# Patient Record
Sex: FEMALE | Race: WHITE | NOT HISPANIC OR LATINO | URBAN - METROPOLITAN AREA
[De-identification: names, ages, dates, MRNs, and addresses within clinical notes are randomized per-mention and may not be internally consistent; named-entity substitution may affect disease eponyms.]

---

## 2017-01-14 ENCOUNTER — DOCUMENTATION ONLY (OUTPATIENT)
Dept: NEUROSURGERY | Facility: CLINIC | Age: 32
End: 2017-01-14

## 2017-01-14 NOTE — PROGRESS NOTES
Patient called in to say she was doing something yesterday and felt a pop in the back of her head. 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 I have recommended to sit with her head up as much as possible and we will see her in clinic soon. I do not think she needs to be advised to be admitted or seen in the ER unless she has other new neurological symptoms or the pain is uncontrollable. She is agreeable with plan and will call Monday am to get scheduled.

## 2017-01-17 ENCOUNTER — APPOINTMENT (OUTPATIENT)
Dept: MRI IMAGING | Facility: CLINIC | Age: 32
End: 2017-01-17
Attending: EMERGENCY MEDICINE
Payer: MEDICARE

## 2017-01-17 ENCOUNTER — HOSPITAL ENCOUNTER (EMERGENCY)
Facility: CLINIC | Age: 32
Discharge: HOME | End: 2017-01-17
Attending: EMERGENCY MEDICINE | Admitting: EMERGENCY MEDICINE
Payer: MEDICARE

## 2017-01-17 ENCOUNTER — TELEPHONE (OUTPATIENT)
Dept: NEUROSURGERY | Facility: CLINIC | Age: 32
End: 2017-01-17

## 2017-01-17 ENCOUNTER — APPOINTMENT (OUTPATIENT)
Dept: CT IMAGING | Facility: CLINIC | Age: 32
End: 2017-01-17
Attending: EMERGENCY MEDICINE
Payer: MEDICARE

## 2017-01-17 VITALS
WEIGHT: 250 LBS | SYSTOLIC BLOOD PRESSURE: 123 MMHG | BODY MASS INDEX: 41.65 KG/M2 | TEMPERATURE: 100.5 F | RESPIRATION RATE: 18 BRPM | OXYGEN SATURATION: 98 % | HEIGHT: 65 IN | HEART RATE: 116 BPM | DIASTOLIC BLOOD PRESSURE: 90 MMHG

## 2017-01-17 DIAGNOSIS — G95.89 ACQUIRED TETHERED SPINAL CORD (H): ICD-10-CM

## 2017-01-17 DIAGNOSIS — R51.9 HEADACHE: ICD-10-CM

## 2017-01-17 DIAGNOSIS — G93.5 CHIARI MALFORMATION TYPE I (H): ICD-10-CM

## 2017-01-17 LAB
ALBUMIN SERPL-MCNC: 3.4 G/DL (ref 3.4–5)
ALP SERPL-CCNC: 100 U/L (ref 40–150)
ALT SERPL W P-5'-P-CCNC: 13 U/L (ref 0–50)
ANION GAP SERPL CALCULATED.3IONS-SCNC: 9 MMOL/L (ref 3–14)
AST SERPL W P-5'-P-CCNC: 17 U/L (ref 0–45)
BASOPHILS # BLD AUTO: 0 10E9/L (ref 0–0.2)
BASOPHILS NFR BLD AUTO: 0.2 %
BILIRUB SERPL-MCNC: 0.4 MG/DL (ref 0.2–1.3)
BUN SERPL-MCNC: 12 MG/DL (ref 7–30)
CALCIUM SERPL-MCNC: 8.8 MG/DL (ref 8.5–10.1)
CHLORIDE SERPL-SCNC: 101 MMOL/L (ref 94–109)
CO2 SERPL-SCNC: 26 MMOL/L (ref 20–32)
CREAT SERPL-MCNC: 0.84 MG/DL (ref 0.52–1.04)
CRP SERPL-MCNC: 3.3 MG/L (ref 0–8)
DIFFERENTIAL METHOD BLD: ABNORMAL
EOSINOPHIL # BLD AUTO: 0.1 10E9/L (ref 0–0.7)
EOSINOPHIL NFR BLD AUTO: 1.1 %
ERYTHROCYTE [DISTWIDTH] IN BLOOD BY AUTOMATED COUNT: 13.1 % (ref 10–15)
ERYTHROCYTE [SEDIMENTATION RATE] IN BLOOD BY WESTERGREN METHOD: 13 MM/H (ref 0–20)
GFR SERPL CREATININE-BSD FRML MDRD: 78 ML/MIN/1.7M2
GLUCOSE SERPL-MCNC: 102 MG/DL (ref 70–99)
HCT VFR BLD AUTO: 44.6 % (ref 35–47)
HGB BLD-MCNC: 14.2 G/DL (ref 11.7–15.7)
IMM GRANULOCYTES # BLD: 0 10E9/L (ref 0–0.4)
IMM GRANULOCYTES NFR BLD: 0.2 %
LYMPHOCYTES # BLD AUTO: 2.1 10E9/L (ref 0.8–5.3)
LYMPHOCYTES NFR BLD AUTO: 17.4 %
MCH RBC QN AUTO: 29.2 PG (ref 26.5–33)
MCHC RBC AUTO-ENTMCNC: 31.8 G/DL (ref 31.5–36.5)
MCV RBC AUTO: 92 FL (ref 78–100)
MONOCYTES # BLD AUTO: 0.6 10E9/L (ref 0–1.3)
MONOCYTES NFR BLD AUTO: 4.6 %
NEUTROPHILS # BLD AUTO: 9.3 10E9/L (ref 1.6–8.3)
NEUTROPHILS NFR BLD AUTO: 76.5 %
NRBC # BLD AUTO: 0 10*3/UL
NRBC BLD AUTO-RTO: 0 /100
PLATELET # BLD AUTO: 276 10E9/L (ref 150–450)
POTASSIUM SERPL-SCNC: 3.9 MMOL/L (ref 3.4–5.3)
PROCALCITONIN SERPL-MCNC: NORMAL NG/ML
PROT SERPL-MCNC: 7.5 G/DL (ref 6.8–8.8)
RBC # BLD AUTO: 4.86 10E12/L (ref 3.8–5.2)
SODIUM SERPL-SCNC: 136 MMOL/L (ref 133–144)
WBC # BLD AUTO: 12.2 10E9/L (ref 4–11)

## 2017-01-17 PROCEDURE — 96376 TX/PRO/DX INJ SAME DRUG ADON: CPT | Performed by: EMERGENCY MEDICINE

## 2017-01-17 PROCEDURE — 99285 EMERGENCY DEPT VISIT HI MDM: CPT | Mod: Z6 | Performed by: EMERGENCY MEDICINE

## 2017-01-17 PROCEDURE — 25000132 ZZH RX MED GY IP 250 OP 250 PS 637: Mod: GY | Performed by: EMERGENCY MEDICINE

## 2017-01-17 PROCEDURE — 85652 RBC SED RATE AUTOMATED: CPT | Performed by: EMERGENCY MEDICINE

## 2017-01-17 PROCEDURE — 25500045 ZZH RX 255: Performed by: EMERGENCY MEDICINE

## 2017-01-17 PROCEDURE — 86140 C-REACTIVE PROTEIN: CPT | Performed by: EMERGENCY MEDICINE

## 2017-01-17 PROCEDURE — A9270 NON-COVERED ITEM OR SERVICE: HCPCS | Mod: GY | Performed by: EMERGENCY MEDICINE

## 2017-01-17 PROCEDURE — 70450 CT HEAD/BRAIN W/O DYE: CPT

## 2017-01-17 PROCEDURE — 70553 MRI BRAIN STEM W/O & W/DYE: CPT

## 2017-01-17 PROCEDURE — 25000128 H RX IP 250 OP 636: Performed by: EMERGENCY MEDICINE

## 2017-01-17 PROCEDURE — A9585 GADOBUTROL INJECTION: HCPCS | Performed by: EMERGENCY MEDICINE

## 2017-01-17 PROCEDURE — 25000128 H RX IP 250 OP 636

## 2017-01-17 PROCEDURE — 25000125 ZZHC RX 250: Performed by: EMERGENCY MEDICINE

## 2017-01-17 PROCEDURE — 84145 PROCALCITONIN (PCT): CPT | Performed by: EMERGENCY MEDICINE

## 2017-01-17 PROCEDURE — 96375 TX/PRO/DX INJ NEW DRUG ADDON: CPT | Performed by: EMERGENCY MEDICINE

## 2017-01-17 PROCEDURE — 80053 COMPREHEN METABOLIC PANEL: CPT | Performed by: EMERGENCY MEDICINE

## 2017-01-17 PROCEDURE — 96361 HYDRATE IV INFUSION ADD-ON: CPT | Performed by: EMERGENCY MEDICINE

## 2017-01-17 PROCEDURE — 96374 THER/PROPH/DIAG INJ IV PUSH: CPT | Performed by: EMERGENCY MEDICINE

## 2017-01-17 PROCEDURE — 99285 EMERGENCY DEPT VISIT HI MDM: CPT | Mod: 25 | Performed by: EMERGENCY MEDICINE

## 2017-01-17 PROCEDURE — 72156 MRI NECK SPINE W/O & W/DYE: CPT

## 2017-01-17 PROCEDURE — 85025 COMPLETE CBC W/AUTO DIFF WBC: CPT | Performed by: EMERGENCY MEDICINE

## 2017-01-17 RX ORDER — METHOCARBAMOL 750 MG/1
750 TABLET, FILM COATED ORAL 3 TIMES DAILY PRN
Qty: 60 TABLET | Refills: 0 | Status: SHIPPED | OUTPATIENT
Start: 2017-01-17

## 2017-01-17 RX ORDER — OLANZAPINE 5 MG/1
5 TABLET, ORALLY DISINTEGRATING ORAL ONCE
Status: COMPLETED | OUTPATIENT
Start: 2017-01-17 | End: 2017-01-17

## 2017-01-17 RX ORDER — GADOBUTROL 604.72 MG/ML
10 INJECTION INTRAVENOUS ONCE
Status: COMPLETED | OUTPATIENT
Start: 2017-01-17 | End: 2017-01-17

## 2017-01-17 RX ORDER — MORPHINE SULFATE 15 MG/1
15 TABLET ORAL EVERY 6 HOURS PRN
Qty: 40 TABLET | Refills: 0 | Status: SHIPPED | OUTPATIENT
Start: 2017-01-17

## 2017-01-17 RX ORDER — GABAPENTIN 400 MG/1
CAPSULE ORAL
COMMUNITY

## 2017-01-17 RX ORDER — ONDANSETRON 2 MG/ML
8 INJECTION INTRAMUSCULAR; INTRAVENOUS ONCE
Status: COMPLETED | OUTPATIENT
Start: 2017-01-17 | End: 2017-01-17

## 2017-01-17 RX ADMIN — HYDROMORPHONE HYDROCHLORIDE 1 MG: 1 INJECTION, SOLUTION INTRAMUSCULAR; INTRAVENOUS; SUBCUTANEOUS at 12:50

## 2017-01-17 RX ADMIN — ONDANSETRON 8 MG: 2 INJECTION INTRAMUSCULAR; INTRAVENOUS at 10:21

## 2017-01-17 RX ADMIN — HYDROMORPHONE HYDROCHLORIDE 1 MG: 1 INJECTION, SOLUTION INTRAMUSCULAR; INTRAVENOUS; SUBCUTANEOUS at 11:23

## 2017-01-17 RX ADMIN — SODIUM CHLORIDE 1000 ML: 9 INJECTION, SOLUTION INTRAVENOUS at 10:19

## 2017-01-17 RX ADMIN — HYDROMORPHONE HYDROCHLORIDE 1 MG: 1 INJECTION, SOLUTION INTRAMUSCULAR; INTRAVENOUS; SUBCUTANEOUS at 15:17

## 2017-01-17 RX ADMIN — GADOBUTROL 10 ML: 604.72 INJECTION INTRAVENOUS at 14:57

## 2017-01-17 RX ADMIN — HYDROMORPHONE HYDROCHLORIDE 1 MG: 1 INJECTION, SOLUTION INTRAMUSCULAR; INTRAVENOUS; SUBCUTANEOUS at 13:54

## 2017-01-17 RX ADMIN — HYDROMORPHONE HYDROCHLORIDE 1 MG: 1 INJECTION, SOLUTION INTRAMUSCULAR; INTRAVENOUS; SUBCUTANEOUS at 17:27

## 2017-01-17 RX ADMIN — HYDROMORPHONE HYDROCHLORIDE 1 MG: 1 INJECTION, SOLUTION INTRAMUSCULAR; INTRAVENOUS; SUBCUTANEOUS at 10:24

## 2017-01-17 RX ADMIN — OLANZAPINE 5 MG: 5 TABLET, ORALLY DISINTEGRATING ORAL at 13:51

## 2017-01-17 RX ADMIN — HYDROMORPHONE HYDROCHLORIDE 1 MG: 1 INJECTION, SOLUTION INTRAMUSCULAR; INTRAVENOUS; SUBCUTANEOUS at 11:54

## 2017-01-17 ASSESSMENT — ENCOUNTER SYMPTOMS
SHORTNESS OF BREATH: 0
HEADACHES: 1
FEVER: 1
VOMITING: 1
BACK PAIN: 1
NECK PAIN: 1

## 2017-01-17 NOTE — CONSULTS
Neurosurgery Consult Note     CC: headache, fever, vomiting     HPI: The patient is a 31 year old with a history of chiari malformation s/p decompressions (initially in  with Dr. Costa at [facility]) complicated by tethering requiring re-operation due to symptoms of ocular abnormalities and central vestibular disturbance. She underwent a redo surgery on  for reexploration of Chiari decompression for un-tethering of the brainstem with Dr. Elizalde. At that time arachnoid adhesions were identified and released. A C2 and C3 laminectomy as well as an expansion duraplasty were performed. She states that with her previous operations she required hospitalization for meningitis and at one point had a shunt placed which has since been removed.    Today she presents with symptoms since Saturday of pounding positional headache with radiation into her upper back and shoulder. She also reports subjective fever that was measured at 100.5 in the ER. She also reports nausea and vomiting during this time with saliva color and consistency upon awakening this morning.  There is a painful compressible budding at her occiput that had been dependent on her position but now has persisted independent of position. She denies any drainage from her incision. Wbc is 12.2. ESR is 13, procal is <0.05, CRP is 3.3.     Past Medical/Surgical History  Past Medical History   Diagnosis Date     PONV (postoperative nausea and vomiting)      Vertigo      Double vision      Unsteady gait      History of Chiari malformation        Past Surgical History   Procedure Laterality Date     Tonsillectomy & adenoidectomy       Cholecystectomy        section       x2     Brain surgery       x 6 done at [facility]--Chiari malformation      Decompression chiari N/A 2016     Procedure: DECOMPRESSION CHIARI;  Surgeon: Tremayne Elizalde MD;  Location:  OR       Family History  No known family history of neurologic disease.     Social History  Social  History   Substance Use Topics     Smoking status: Current Every Day Smoker -- 1.00 packs/day     Types: Cigarettes     Smokeless tobacco: Current User     Alcohol Use: No       Medications  Current Outpatient Prescriptions   Medication Sig Dispense Refill     OXYCODONE HCL PO Take 10 mg by mouth every 8 hours       gabapentin (NEURONTIN) 400 MG capsule Take 1 pill in Am and again at noon. Then take 3 pills at bedtime. 150 capsule 3     morphine (MSIR) 15 MG IR tablet Take 1 tablet (15 mg) by mouth every 4 hours as needed for moderate to severe pain 120 tablet 0     ibuprofen (ADVIL/MOTRIN) 800 MG tablet Take 1 tablet (800 mg) by mouth every 6 hours as needed for moderate pain 60 tablet 0     senna-docusate (SENOKOT-S;PERICOLACE) 8.6-50 MG per tablet Take 2 tablets by mouth 2 times daily 100 tablet 0     calcium carbonate (TUMS) 500 MG chewable tablet Take 1 chew tab by mouth as needed for heartburn       Zolpidem Tartrate (AMBIEN PO) Take 10 mg by mouth At Bedtime          Allergies  No Known Allergies    ROS: 10 point ROS of systems including Constitutional, Eyes, Respiratory, Cardiovascular, Gastroenterology, Genitourinary, Integumentary, Muscularskeletal, Psychiatric were all negative except for pertinent positives noted in my HPI.     Physical Exam:     General: tearful  Comfortable respiratory effort; normal cardiac rhythm.     Mental Status:   awake, alert and oriented x3.   fluent, communicative, intact comprehension.    Cranial Nerves:  equal and reactive pupils   extraocular movements preserved   Full visual fields   no dysarthria.   No facial sensory loss     Motor/Sensory:  5/5 strength throughout upper and lower extremities.  No deficits to pain, soft touch.     Reflexes: symmetric     Incision: clean dry and intact     Incision: clean, dry, intact; soft tissue swelling at the occiput, measuring ~ 3x3 cm, compressible, pain on compression.     Labs  CMPNo lab results found in last 7 days.  CBC  Recent  Labs  Lab 01/17/17  1008   WBC 12.2*   RBC 4.86   HGB 14.2   HCT 44.6   MCV 92   MCH 29.2   MCHC 31.8   RDW 13.1        Imaging: reviewed     Assessment/Plan:  The patient is a 31 year old with a history of chiari malformation s/p decompressions (initially in 2007 with Dr. Costa at [facility]) complicated by tethering requiring re-operation due to symptoms of ocular abnormalities and central vestibular disturbance. She also reports a history of meningitis. Inflammatory markers do not reflect an infectious process.    - please obtain an MRI of the brain and cervical spine to assess for pseudomeningocele vs. hydrocephalus  - will discuss the possibility of a lumbar puncture with the patient  - pain control per ER team    John (Jack) M. Leschke, M.D.     Our patient was discussed with my chief resident.    Attending: Vivi has neck pain and headache but no other indicators of infection. He scan shows pseudomeningocele formation which can cause pain, but does not indicate infection. There is no external CSF leakage. Despite this, because of her past experience with meningitis we have offered a lumbar puncture if the increased certainty will ease her concern and also because it might temporarily relieve the pain related to postoperative inflammation and pseudomeningocele formation. She has declined the lumbar puncture. We will follow in clinic.     I have reviewed the records and imaging and edited the resident's note and agree with the plan of care but did not see the patient at this time. - Tremayne Elizalde MD

## 2017-01-17 NOTE — TELEPHONE ENCOUNTER
Vivi called at 0730 this morning to report increasing head fullness, and nausea and now a temp of 102.  I instructed her to go to the ED and notify the resident on call.

## 2017-01-17 NOTE — ED PROVIDER NOTES
History     Chief Complaint   Patient presents with     Headache     HPI  Vivi Day is a 31 year old female with a medical history significant for Chiari malformation status post decompression exploration x7, most recently 16 with contemporaneous intradural release of brainstem dorsal arachnoid adhesions with C2 and C3 laminectomy (1.5 months ago) who presents to the emergency department for evaluation of headache, fever, and vomiting. Patient relates onset of symptoms on Saturday (3 days ago) with 10 second episodes of a pounding headache when standing up. She reports that these episodes would initially resolve spontaneously, but states she has now developed a constant pain throughout her entire head that radiates into her neck, upper back, and shoulders. Patient notes that she developed a fever around 1900 last night (~15 hours ago) that peaked at 102.5 F in the middle of the night; she is currently 100.5 F. Additionally, she notes vomiting that began this morning, and states these symptoms are reminiscent of what she experienced status post her reported six previous Chiari decompression explorations.     I have reviewed the Medications, Allergies, Past Medical and Surgical History, and Social History in the [employer] system.    PAST MEDICAL HISTORY:   Past Medical History   Diagnosis Date     PONV (postoperative nausea and vomiting)      Vertigo      Double vision      Unsteady gait      History of Chiari malformation        PAST SURGICAL HISTORY:   Past Surgical History   Procedure Laterality Date     Tonsillectomy & adenoidectomy       Cholecystectomy        section       x2     Brain surgery       x 6 done at [facility]--Chiari malformation      Decompression chiari N/A 2016     Procedure: DECOMPRESSION CHIARI;  Surgeon: Tremayne Elizalde MD;  Location:  OR       FAMILY HISTORY: No family history on file.    SOCIAL HISTORY:   Social History   Substance Use Topics     Smoking  "status: Current Every Day Smoker -- 1.00 packs/day     Types: Cigarettes     Smokeless tobacco: Current User     Alcohol Use: No       Discharge Medication List as of 1/17/2017  6:46 PM      START taking these medications    Details   methocarbamol (ROBAXIN) 750 MG tablet Take 1 tablet (750 mg) by mouth 3 times daily as needed for muscle spasms, Disp-60 tablet, R-0, Local Print         CONTINUE these medications which have CHANGED    Details   morphine (MSIR) 15 MG IR tablet Take 1 tablet (15 mg) by mouth every 6 hours as needed for moderate to severe pain, Disp-40 tablet, R-0, Local Print         CONTINUE these medications which have NOT CHANGED    Details   OXYCODONE HCL PO Take 10 mg by mouth every 8 hours, Historical      DIPHENHYDRAMINE HCL PO Take 50 mg by mouth At Bedtime, Historical      gabapentin (NEURONTIN) 400 MG capsule Take 1 capsule by mouth in the AM and 1 capsule by mouth at noon. Then take 3 capsules by mouth at bedtime., Historical      ibuprofen (ADVIL/MOTRIN) 800 MG tablet Take 1 tablet (800 mg) by mouth every 6 hours as needed for moderate pain, Disp-60 tablet, R-0, Local Print      senna-docusate (SENOKOT-S;PERICOLACE) 8.6-50 MG per tablet Take 2 tablets by mouth 2 times daily, Disp-100 tablet, R-0, Fax      Zolpidem Tartrate (AMBIEN PO) Take 10 mg by mouth At Bedtime , Historical              No Known Allergies    Review of Systems   Constitutional: Positive for fever.   Respiratory: Negative for shortness of breath.    Cardiovascular: Negative for chest pain.   Gastrointestinal: Positive for vomiting.   Musculoskeletal: Positive for back pain and neck pain.   Neurological: Positive for headaches.       Physical Exam   BP: (!) 146/107 mmHg  Pulse: 116  Temp: 100.5  F (38.1  C)  Resp: 18  Height: 165.1 cm (5' 5\")  Weight: 113.399 kg (250 lb)  SpO2: 96 %  Physical Exam   Constitutional: She is oriented to person, place, and time. Vital signs are normal. She appears well-developed and " well-nourished.  Non-toxic appearance. She does not have a sickly appearance. She appears distressed (obvious discomfort).   HENT:   Head: Normocephalic and atraumatic.   Eyes: No scleral icterus.   Neck:       Cardiovascular: Normal rate.    Pulmonary/Chest: Effort normal. No respiratory distress.   Abdominal: Soft.   Neurological: She is alert and oriented to person, place, and time.   Skin: Skin is warm and dry. No rash noted. She is not diaphoretic. No erythema. No pallor.       ED Course   Procedures       10:12 AM  The patient was seen and examined by Aki Conklin MD in Room 4.          Critical Care time:  none               Labs Ordered and Resulted from Time of ED Arrival Up to the Time of Departure from the ED   COMPREHENSIVE METABOLIC PANEL - Abnormal; Notable for the following:     Glucose 102 (*)     All other components within normal limits   CBC WITH PLATELETS DIFFERENTIAL - Abnormal; Notable for the following:     WBC 12.2 (*)     Absolute Neutrophil 9.3 (*)     All other components within normal limits   ERYTHROCYTE SEDIMENTATION RATE AUTO   PROCALCITONIN   CRP INFLAMMATION     Results for orders placed or performed during the hospital encounter of 01/17/17   Head CT w/o contrast    Narrative    CT HEAD W/O CONTRAST 1/17/2017 12:10 PM    History: neuro surgery recent surgery, headache, fever    Comparison: Outside brain MRI 10/3/2007,. Outside brain MRI dated  2/4/2015 were not used as comparison, as the study could not be  appropriately windowed     Technique: Using multidetector thin collimation helical acquisition  technique, axial, coronal and sagittal CT images from the skull base  to the vertex were obtained without intravenous contrast.     Findings:    Postsurgical changes of suboccipital craniectomy for Chiari  decompression. There is fluid collection extending from the level of  the craniectomy defect superiorly superficial to the occipital bone,  likely representing pseudomeningocele.  Right parietal quirino hole.    No intracranial hemorrhage, mass effect, or midline shift. The fourth  ventricle appears mildly to moderately enlarged. The lateral  ventricles and the third ventricle are normal. The gray to white  matter differentiation of the cerebral hemispheres is preserved. The  basal cisterns are patent.    The visualized paranasal sinuses are clear. The mastoid air cells are  clear.       Impression    Impression:   1. No acute intracranial pathology.  2. Postsurgical changes of suboccipital craniectomy for Chiari  decompression.  3. Moderate fourth ventriculomegaly.    I have personally reviewed the examination and initial interpretation  and I agree with the findings.    GINA SINGH MD   MR Brain w/o & w Contrast    Narrative    Brain MRI without and with contrast    History: s/p neuro surgery, worsening headache.  Comparison: Head CT same day, MR brain 2/4/2015    Technique: Axial FLAIR,  T1-weighted, and susceptibility images were  obtained without intravenous contrast. Following intravenous  gadolinium-based contrast administration, axial T2-weighted,  diffusion, FLAIR, and axial and coronal T1-weighted images were  obtained.     Dose: 10 ml Gadavist injected    Findings:   There is no mass effect, midline shift, or evidence of intracranial  hemorrhage.  Stable dilatation of the fourth ventricle in comparison  to the third and lateral ventricles which are normal in size. The  gray-white matter differentiation of the cerebral hemispheres is  preserved. Postcontrast images demonstrate no abnormal intracranial  parenchymal or meningeal enhancement.    Stable postoperative changes of Chiari decompression with suboccipital  craniectomy. Within the subcutaneous soft tissues of the occipital  scalp at the decompression site there is a bilobed T2 hyperintense, T1  hypointense, peripherally enhancing collection without restricted  diffusion measuring approximate 3.2 x 2.6 x 4.2 cm, similar  to  findings on head CT earlier same day. This progresses inferiorly off  the field-of-view along the deep soft tissues of the neck better  evaluated on cervical spine MRI same day.    The major vascular flow-voids appear patent. The orbits, visualized  portions of paranasal sinuses, and mastoid air cells are relatively  clear.      Impression    Impression:  1. No intracranial hemorrhage, midline shift, or hydrocephalus. Stable  dilatation of the fourth ventricle.  2. No abnormal contrast enhancing lesions intracranially.  3. Stable postoperative change of Chiari decompression with CSF signal  collection at site of suboccipital craniectomy likely representing  pseudomeningocele. Please see cervical spine MRI dated same day for  evaluation of caudal extent of collection.    I have personally reviewed the examination and initial interpretation  and I agree with the findings.    GINA SINGH MD   MR Cervical Spine w/o & w Contrast    Narrative    MR CERVICAL SPINE W/O & W CONTRAST 1/17/2017 2:57 PM    History: Chiari malformation s/p decompression in 2007 complicated by  meningitis and tethering. Repeat surgery on 12/1 for un-tethering of  the brainstem with arachnoid adhesions released and C2 and C3  laminectomy as well as an expansion duraplasty. Now presents with  pounding positional headache with radiation into her upper back and  shoulder, subjective fever, and nausea and vomiting.     Comparison: MRI cervical spine 2/4/2015    Technique: Sagittal T1-weighted, sagittal T2-weighted, sagittal  diffusion weighted, axial T2-weighted, and axial T2* gradient echo  images of the cervical spine were obtained without intravenous  contrast. Following intravenous administration of gadolinium, axial  and sagittal T1-weighted images with fat saturation were also  obtained.    Contrast: 10 ml Gadavist injected    Findings:  There are postsurgical changes from revision suboccipital  decompression, C2-3 laminectomy, and  untethering brainstem. There is a  large fluid collection within the surgical bed extending from the  immediate subcutaneous tissue in the occipital region to the dura in  the C2-3 laminectomy defect. Just posterior to C2 and C3 the fluid  collection extends laterally beyond on the facet joints on both sides.  Fluid collection is filled with debris showing diffuse heterogeneity  on T2. There is diffuse surrounding enhancement. In the deep  paraspinous tissues, the fluid collection follows the posterior dura,  tectorial membrane, and occiput superiorly where it communicates with  the second somewhat separate subcutaneous fluid collection that  extends inferiorly again. Subcutaneous portion measures approximately  3. 0.5 x 3.2 x 3.0 cm. The deep portion measures approximately 11.5 cm  in length following the curvature of the occiput, the posterior aspect  cranial cervical junction, and the posterior aspect of the superior  cervical spine. It measures approximately 4.5 cm in width and 1.9 cm  AP. The fluid collection bulges slightly through the laminectomy  defect creating mild spinal canal narrowing at the C2 and C3 levels.  There is some dural thickening and enhancement within the posterior  epidural space at the C4 and C5 levels. There is no definite  restricted diffusion associated with this fluid collection.    There are sequela of prior Chiari malformation with a deformed  brainstem, and enlarged fourth ventricle. There are a few foci of  hyperintensity within the superior medulla oblongata similar to before  consistent with chronic encephalomalacia.    There is diffuse mild congenital narrowing of the cervical spinal  canal. There is increasing reversal of the cervical lordosis centered  at C4-5. Cervical spine is otherwise in anatomic alignment. There is  multilevel mild degenerative changes greatest at C5-6 including a disc  osteophyte complex at C5-6 without significant spinal canal or neural  foraminal  narrowing.      Impression    Impression:   There are postsurgical changes from the patient's suboccipital  decompression with a large fluid collection in the surgical bed. The  fluid collection has 2 main components. There is one in the deep  tissues following the posterior aspect of the spine, craniocervical  junction, and occiput. It communicates superiorly with a another large  fluid collection in the immediate subcutaneous tissues. Appearance is  most consistent with a seroma, although infection is not completely  excluded. Collection bulges slightly through the C2 and C3 laminectomy  defects creating mild spinal canal narrowing.         I have personally reviewed the examination and initial interpretation  and I agree with the findings.    GINA SINGH MD   Comprehensive metabolic panel   Result Value Ref Range    Sodium 136 133 - 144 mmol/L    Potassium 3.9 3.4 - 5.3 mmol/L    Chloride 101 94 - 109 mmol/L    Carbon Dioxide 26 20 - 32 mmol/L    Anion Gap 9 3 - 14 mmol/L    Glucose 102 (H) 70 - 99 mg/dL    Urea Nitrogen 12 7 - 30 mg/dL    Creatinine 0.84 0.52 - 1.04 mg/dL    GFR Estimate 78 >60 mL/min/1.7m2    GFR Estimate If Black >90   GFR Calc   >60 mL/min/1.7m2    Calcium 8.8 8.5 - 10.1 mg/dL    Bilirubin Total 0.4 0.2 - 1.3 mg/dL    Albumin 3.4 3.4 - 5.0 g/dL    Protein Total 7.5 6.8 - 8.8 g/dL    Alkaline Phosphatase 100 40 - 150 U/L    ALT 13 0 - 50 U/L    AST 17 0 - 45 U/L   CBC with platelets differential   Result Value Ref Range    WBC 12.2 (H) 4.0 - 11.0 10e9/L    RBC Count 4.86 3.8 - 5.2 10e12/L    Hemoglobin 14.2 11.7 - 15.7 g/dL    Hematocrit 44.6 35.0 - 47.0 %    MCV 92 78 - 100 fl    MCH 29.2 26.5 - 33.0 pg    MCHC 31.8 31.5 - 36.5 g/dL    RDW 13.1 10.0 - 15.0 %    Platelet Count 276 150 - 450 10e9/L    Diff Method Automated Method     % Neutrophils 76.5 %    % Lymphocytes 17.4 %    % Monocytes 4.6 %    % Eosinophils 1.1 %    % Basophils 0.2 %    % Immature Granulocytes 0.2 %     Nucleated RBCs 0 0 /100    Absolute Neutrophil 9.3 (H) 1.6 - 8.3 10e9/L    Absolute Lymphocytes 2.1 0.8 - 5.3 10e9/L    Absolute Monocytes 0.6 0.0 - 1.3 10e9/L    Absolute Eosinophils 0.1 0.0 - 0.7 10e9/L    Absolute Basophils 0.0 0.0 - 0.2 10e9/L    Abs Immature Granulocytes 0.0 0 - 0.4 10e9/L    Absolute Nucleated RBC 0.0    Erythrocyte sedimentation rate auto   Result Value Ref Range    Sed Rate 13 0 - 20 mm/h   Procalcitonin   Result Value Ref Range    Procalcitonin  ng/ml     <0.05  <0.05 ng/ml  Normal  Recommendation: Very low risk of bacterial infection.   Discourage antibiotics unless strong clinical suspicion for serious infection.     CRP inflammation   Result Value Ref Range    CRP Inflammation 3.3 0.0 - 8.0 mg/L     Medications   0.9% sodium chloride BOLUS (0 mLs Intravenous Stopped 1/17/17 1233)   ondansetron (ZOFRAN) injection 8 mg (8 mg Intravenous Given 1/17/17 1021)   HYDROmorphone (DILAUDID) injection 1 mg (1 mg Intravenous Given 1/17/17 1024)   HYDROmorphone (DILAUDID) injection 1 mg (1 mg Intravenous Given 1/17/17 1123)   HYDROmorphone (DILAUDID) 1 MG/ML injection (1 mg  Given 1/17/17 1154)   HYDROmorphone (DILAUDID) injection 1 mg (1 mg Intravenous Given 1/17/17 1517)   OLANZapine zydis (zyPREXA) ODT tab 5 mg (5 mg Oral Given 1/17/17 1351)   gadobutrol (GADAVIST) injection 10 mL (10 mLs Intravenous Given 1/17/17 1457)       Assessments & Plan (with Medical Decision Making)   This is a 30 y/o female presenting to the ED with complaints of severe discomfort, nausea and fever s/p neurosurgery. The patient was emergently examined and found to be obviously uncomfortable. She has tenderness to the base of the neck without signs of infection. The case was discussed with neurosurgery. They evaluated the patient and requested and MRI. Labs were draw and found to be reassuring. Pt did require several doses of opiates. Initially it was believed the patient would be admitted to surgery however there was  a change in the plan after the resident discussed with staff. The patient became very upset with the resident and requested I speak with the staff. I did make contact with Dr. Elizalde and discussed the case and the interaction with the resident as the patient. Currently the plan is for a senior resident to evaluate the patient and disposition will be made at that time. I will sign the patient out to the evening physician for disposition planning.  I have reviewed the nursing notes.    I have reviewed the findings, diagnosis, plan and need for follow up with the patient.    Discharge Medication List as of 1/17/2017  6:46 PM      START taking these medications    Details   methocarbamol (ROBAXIN) 750 MG tablet Take 1 tablet (750 mg) by mouth 3 times daily as needed for muscle spasms, Disp-60 tablet, R-0, Local Print             Final diagnoses:   Headache     Yony HAMMONDS, am serving as a trained medical scribe to document services personally performed by Aki Conklin MD, based on the provider's statements to me.   Aki HAMMONDS MD, was physically present and have reviewed and verified the accuracy of this note documented by Yony Lobato.  1/17/2017   [facility], [place], EMERGENCY DEPARTMENT      Aki Conklin MD  01/20/17 4769

## 2017-01-17 NOTE — ED NOTES
Pt presents ambulatory to triage from home with benita. Pt states had Brain stem surgery on 12/1/16. States past 3-4 days has had headache, nausea, emesis, fever and sensitivity to light. Pt feels weak and lethargic. Pt A and O x 4. Has appointment with neurosurgery at 1300 today r/t s/sx.

## 2017-01-17 NOTE — ED AVS SNAPSHOT
[facility], Emergency Department    [address] [facility] [address]    Phone:  [phone]                                       Vivi Day   MRN: [id number]    Department:  [facility], Emergency Department   Date of Visit:  1/17/2017           Patient Information     Date Of Birth          1985        Your diagnoses for this visit were:     Headache     Chiari malformation type I (H)     Acquired tethered spinal cord (H)        You were seen by Aki Conklin MD.        Discharge Instructions       Please make an appointment to follow up with Neurosurgery Clinic (phone: [phone]) tomorrow to make an appointment for next week.      Methocarbamol and morphine as directed.    Return to the emergency Department for any problems.        Future Appointments        Provider Department Dept Phone Center    2/22/2017 10:00 AM [facility] MRI ROOM 1 [facility] [phone] [facility]    2/22/2017 11:15 AM Tremayne Elizalde MD [facility] Neurosurgery [phone] [facility]      24 Hour Appointment Hotline       To make an appointment at any [facility], call [phone]). If you don't have a family doctor or clinic, we will help you find one. [place] clinics are conveniently located to serve the needs of you and your family.             Review of your medicines      START taking        Dose / Directions Last dose taken    methocarbamol 750 MG tablet   Commonly known as:  ROBAXIN   Dose:  750 mg   Quantity:  60 tablet        Take 1 tablet (750 mg) by mouth 3 times daily as needed for muscle spasms   Refills:  0          CONTINUE these medicines which may have CHANGED, or have new prescriptions. If we are uncertain of the size of tablets/capsules you have at home, strength may be listed as something that might have changed.        Dose / Directions Last dose taken    morphine 15 MG IR tablet   Commonly known as:  MSIR   Dose:  15 mg    What changed:  when to take this   Quantity:  40 tablet        Take 1 tablet (15 mg) by mouth every 6 hours as needed for moderate to severe pain   Refills:  0          Our records show that you are taking the medicines listed below. If these are incorrect, please call your family doctor or clinic.        Dose / Directions Last dose taken    AMBIEN PO   Dose:  10 mg        Take 10 mg by mouth At Bedtime   Refills:  0        DIPHENHYDRAMINE HCL PO   Dose:  50 mg        Take 50 mg by mouth At Bedtime   Refills:  0        gabapentin 400 MG capsule   Commonly known as:  NEURONTIN        Take 1 capsule by mouth in the AM and 1 capsule by mouth at noon. Then take 3 capsules by mouth at bedtime.   Refills:  0        ibuprofen 800 MG tablet   Commonly known as:  ADVIL/MOTRIN   Dose:  800 mg   Quantity:  60 tablet        Take 1 tablet (800 mg) by mouth every 6 hours as needed for moderate pain   Refills:  0        OXYCODONE HCL PO   Dose:  10 mg        Take 10 mg by mouth every 8 hours   Refills:  0        senna-docusate 8.6-50 MG per tablet   Commonly known as:  SENOKOT-S;PERICOLACE   Dose:  2 tablet   Quantity:  100 tablet        Take 2 tablets by mouth 2 times daily   Refills:  0                Prescriptions were sent or printed at these locations (2 Prescriptions)                   Other Prescriptions                Printed at Department/Unit printer (2 of 2)         methocarbamol (ROBAXIN) 750 MG tablet               morphine (MSIR) 15 MG IR tablet                Procedures and tests performed during your visit     CBC with platelets differential    CRP inflammation    Comprehensive metabolic panel    ED Bed Request    Erythrocyte sedimentation rate auto    Head CT w/o contrast    MR Brain w/o & w Contrast    MR Cervical Spine w/o & w Contrast    Medication History IP Pharmacy Consult    Procalcitonin      Orders Needing Specimen Collection     None      Pending Results     No orders found from 1/16/2017 to 1/18/2017.  "           Pending Culture Results     No orders found from 2017 to 2017.            Thank you for choosing [facility]       Thank you for choosing [facility] for your care. Our goal is always to provide you with excellent care. Hearing back from our patients is one way we can continue to improve our services. Please take a few minutes to complete the written survey that you may receive in the mail after you visit with us. Thank you!        [employer] Information     [employer] lets you send messages to your doctor, view your test results, renew your prescriptions, schedule appointments and more. To sign up, go to [email or web address] . Click on \"Log in\" on the left side of the screen, which will take you to the Welcome page. Then click on \"Sign up Now\" on the right side of the page.     You will be asked to enter the access code listed below, as well as some personal information. Please follow the directions to create your username and password.     Your access code is: [email or web address]  Expires: 2017  5:30 AM     Your access code will  in 90 days. If you need help or a new code, please call your [facility] clinic or [phone].        Care EveryWhere ID     This is your Care EveryWhere ID. This could be used by other organizations to access your [facility] medical records  [id number]        After Visit Summary       This is your record. Keep this with you and show to your community pharmacist(s) and doctor(s) at your next visit.                  "

## 2017-01-17 NOTE — PROGRESS NOTES
The MRI results were discussed with Dr. Elizalde - no surgical intervention is warranted at this time. Maintain usual neurosurgery follow up.

## 2017-01-17 NOTE — ED AVS SNAPSHOT
[facility], [place], Emergency Department    [address] [place] [address]    Phone:  [phone]                                       Vivi Day   MRN: [id number]    Department:  [facility], Emergency Department   Date of Visit:  1/17/2017           After Visit Summary Signature Page     I have received my discharge instructions, and my questions have been answered. I have discussed any challenges I see with this plan with the nurse or doctor.    ..........................................................................................................................................  Patient/Patient Representative Signature      ..........................................................................................................................................  Patient Representative Print Name and Relationship to Patient    ..................................................               ................................................  Date                                            Time    ..........................................................................................................................................  Reviewed by Signature/Title    ...................................................              ..............................................  Date                                                            Time

## 2017-01-18 NOTE — DISCHARGE INSTRUCTIONS
Please make an appointment to follow up with Neurosurgery Clinic (phone: [phone]) tomorrow to make an appointment for next week.      Methocarbamol and morphine as directed.    Return to the emergency Department for any problems.

## 2017-01-18 NOTE — PHARMACY-ADMISSION MEDICATION HISTORY
Admission medication history interview status for the 1/17/2017 admission is complete. See Epic admission navigator for allergy information, pharmacy, prior to admission medications and immunization status.     Medication history interview sources:    -Patient was an excellent historian and able to communicate medication dosages, frequencies, etc.     Changes made to PTA medication list (reason)  Added:   -Diphenhydramine HCL PO: Take 50 mg by mouth at bedtime; per patient.   Deleted:   -Calcium carbonate (TUMS) 500 mg chewable tablet: Chew 1 tablet by mouth as needed for heartburn; no longer taking per patient.   Changed:   -Gabapentin 400 mg capsule: Sig was modified for clarity.   --Old sig: Take 1 pill in Am and again at noon. Then take 3 pills at bedtime.  --New sig: Take 1 capsule by mouth in the AM and 1 capsule by mouth at noon. Then take 3 capsules by mouth at bedtime.     Additional medication history information (including reliability of information, actions taken by pharmacist):  -Per patient, she was advised not to take zolpidem tartrate after having brain surgery so she switched to taking OTC diphenhydramine 25 mg capsules (take 50 mg by mouth at bedtime) for the time being.    -Per patient, she usually takes morphine 15 mg IR tablets just once a day.   -Patients medication allergies were confirmed, she has no known medication allergies.       Prior to Admission medications    Medication Sig Last Dose Taking? Auth Provider   OXYCODONE HCL PO Take 10 mg by mouth every 8 hours 1/17/2017 at am Yes Reported, Patient   DIPHENHYDRAMINE HCL PO Take 50 mg by mouth At Bedtime 1/16/2017 at pm Yes Unknown, Entered By History   gabapentin (NEURONTIN) 400 MG capsule Take 1 capsule by mouth in the AM and 1 capsule by mouth at noon. Then take 3 capsules by mouth at bedtime. 1/16/2017 at pm Yes Unknown, Entered By History   morphine (MSIR) 15 MG IR tablet Take 1 tablet (15 mg) by mouth every 4 hours as needed for  moderate to severe pain 1/16/2017 at pm Yes Sariah Marie PA-C   ibuprofen (ADVIL/MOTRIN) 800 MG tablet Take 1 tablet (800 mg) by mouth every 6 hours as needed for moderate pain Past Week at Unk Time Yes Sariah Marie PA-C   senna-docusate (SENOKOT-S;PERICOLACE) 8.6-50 MG per tablet Take 2 tablets by mouth 2 times daily Past Week at Unk Time Yes Khai De Leon MD   Zolpidem Tartrate (AMBIEN PO) Take 10 mg by mouth At Bedtime  Past Month at Unknown time Yes Reported, Patient         Medication history completed by: Sophia Sharif, Pharmacy Intern

## 2017-01-20 ENCOUNTER — OFFICE VISIT (OUTPATIENT)
Dept: NEUROSURGERY | Facility: CLINIC | Age: 32
End: 2017-01-20

## 2017-01-20 VITALS
HEART RATE: 93 BPM | OXYGEN SATURATION: 95 % | DIASTOLIC BLOOD PRESSURE: 97 MMHG | WEIGHT: 255 LBS | SYSTOLIC BLOOD PRESSURE: 136 MMHG | TEMPERATURE: 97.9 F | HEIGHT: 66 IN | BODY MASS INDEX: 40.98 KG/M2 | RESPIRATION RATE: 20 BRPM

## 2017-01-20 DIAGNOSIS — Z98.890 POST-OPERATIVE STATE: Primary | ICD-10-CM

## 2017-01-20 DIAGNOSIS — G93.5 CHIARI MALFORMATION TYPE I (H): ICD-10-CM

## 2017-01-20 RX ORDER — PROCHLORPERAZINE MALEATE 10 MG
10 TABLET ORAL EVERY 6 HOURS PRN
Qty: 40 TABLET | Refills: 0 | Status: SHIPPED | OUTPATIENT
Start: 2017-01-20

## 2017-01-20 ASSESSMENT — PAIN SCALES - GENERAL: PAINLEVEL: MODERATE PAIN (5)

## 2017-01-20 NOTE — MR AVS SNAPSHOT
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  Call member services at your health plan with any benefit or coverage questions.      Please bring the following with you to your appointment:    (1) Any X-Rays, CTs or MRIs which have been performed.  Contact the facility where they were done to arrange for  prior to your scheduled appointment.    (2) List of current medications  (3) This referral request   (4) Any documents/labs given to you for this referral                  Follow-up notes from your care team     Return for Follow-up after testing.      Your next 10 appointments already scheduled     Feb 02, 2017  8:30 AM   NEW NEURO with Ed Badillo MD   Eye Clinic ([facility])    [facility]  [address] [facility]  [address] [address] [phone]            Feb 22, 2017 10:00 AM   (Arrive by 9:45 AM)   MR BRAIN W/O CONTRAST with [address] [facility] MRI ([facility])    [address] [id number] [phone]           Take your medicines as usual, unless your doctor tells you not to. Bring a list of your current medicines to your exam (including vitamins, minerals and over-the-counter drugs). Also bring the results of  similar scans you may have had.  Please remove any body piercings and hair extensions before you arrive.  Follow your doctor s orders. If you do not, we may have to postpone your exam.  You will not have contrast for this exam. You do not need to do anything special to prepare.  The MRI machine uses a strong magnet. Please wear clothes without metal (snaps, zippers). A sweatsuit works well, or we may give you a hospital gown.   **IMPORTANT** THE INSTRUCTIONS BELOW ARE ONLY FOR THOSE PATIENTS WHO HAVE BEEN TOLD THEY WILL RECEIVE SEDATION OR GENERAL ANESTHESIA DURING THEIR MRI PROCEDURE:  IF YOU WILL RECEIVE SEDATION (take medicine to help you relax during your exam):   You must get the medicine from your doctor before you arrive. Bring the medicine to the exam. Do not take it at home.   Arrive one hour early. Bring someone who can take you home after the test. Your medicine will make you sleepy. After the exam, you may not drive, take a bus or take a taxi by yourself.   No eating 8 hours before your exam. You may have clear liquids up until 4 hours before your exam. (Clear liquids include water, clear tea, black coffee and fruit juice without pulp.)  IF YOU WILL RECEIVE ANESTHESIA (be asleep for your exam):   Arrive 1 1/2 hours early. Bring someone who can take you home after the test. You may not drive, take a bus or take a taxi by yourself.   No eating 8 hours before your exam. You may have clear liquids up until 4 hours before your exam. (Clear liquids include water, clear tea, black coffee and fruit juice without pulp.)   You will spend four to five hours in the recovery room.  Please call the Imaging Department at your exam site with any questions.            Feb 22, 2017 11:15 AM   (Arrive by 11:00 AM)   Return Visit with Tremayne Elizalde MD   [facility] Neurosurgery ([facility])    [address] [address] [phone]              Future tests  "that were ordered for you today     Open Future Orders        Priority Expected Expires Ordered    Neuro-Opthalmology Referral [9025] Routine 2/3/2017 2018 2017            Who to contact     Please call your clinic at [phone] to:    Ask questions about your health    Make or cancel appointments    Discuss your medicines    Learn about your test results    Speak to your doctor   If you have compliments or concerns about an experience at your clinic, or if you wish to file a complaint, please contact [facility] Patient Relations at [phone] or email us at [email or web address]         Additional Information About Your Visit        C[employer] Information     [employer] is an electronic gateway that provides easy, online access to your medical records. With Kiip, you can request a clinic appointment, read your test results, renew a prescription or communicate with your care team.     To sign up for Ki[employer] visit the website at [email or web address]   You will be asked to enter the access code listed below, as well as some personal information. Please follow the directions to create your username and password.     Your access code is: [email or web address]  Expires: 2017  5:30 AM     Your access code will  in 90 days. If you need help or a new code, please contact your [facility] Clinic or call [phone] for assistance.        Care EveryWhere ID     This is your Care EveryWhere ID. This could be used by other organizations to access your [place] medical records  [id number]        Your Vitals Were     Pulse Temperature Respirations    93 97.9  F (36.6  C) (Oral) 20    Height BMI (Body Mass Index) Pulse Oximetry    1.676 m (5' 6\") 41.18 kg/m2 95%    Last Period Breastfeeding?       2016 No        Blood Pressure from Last 3 Encounters:   17 136/97   17 123/90   16 157/100    Weight from Last 3 Encounters: "   01/20/17 115.667 kg (255 lb)   01/17/17 113.399 kg (250 lb)   12/02/16 119.2 kg (262 lb 12.6 oz)                 Today's Medication Changes          These changes are accurate as of: 1/20/17 12:20 PM.  If you have any questions, ask your nurse or doctor.               Start taking these medicines.        Dose/Directions    prochlorperazine 10 MG tablet   Commonly known as:  COMPAZINE   Used for:  Post-operative state   Started by:  Sariah Marie PA-C        Dose:  10 mg   Take 1 tablet (10 mg) by mouth every 6 hours as needed for nausea or vomiting   Quantity:  40 tablet   Refills:  0            Where to get your medicines      Some of these will need a paper prescription and others can be bought over the counter.  Ask your nurse if you have questions.     Bring a paper prescription for each of these medications    - prochlorperazine 10 MG tablet             Primary Care Provider Office Phone # Fax #    Saranya FERGUSON [facility] [phone]       [facility] [address] [facility] [address]        Thank you!     Thank you for choosing [facility] NEUROSURGERY  for your care. Our goal is always to provide you with excellent care. Hearing back from our patients is one way we can continue to improve our services. Please take a few minutes to complete the written survey that you may receive in the mail after your visit with us. Thank you!             Your Updated Medication List - Protect others around you: Learn how to safely use, store and throw away your medicines at www.disposemymeds.org.          This list is accurate as of: 1/20/17 12:20 PM.  Always use your most recent med list.                   Brand Name Dispense Instructions for use    AMBIEN PO      Take 10 mg by mouth At Bedtime       DIPHENHYDRAMINE HCL PO      Take 50 mg by mouth At Bedtime       gabapentin 400 MG capsule    NEURONTIN     Take 1 capsule by mouth in the AM and 1 capsule by mouth at noon. Then take 3 capsules by mouth  at bedtime.       ibuprofen 800 MG tablet    ADVIL/MOTRIN    60 tablet    Take 1 tablet (800 mg) by mouth every 6 hours as needed for moderate pain       methocarbamol 750 MG tablet    ROBAXIN    60 tablet    Take 1 tablet (750 mg) by mouth 3 times daily as needed for muscle spasms       morphine 15 MG IR tablet    MSIR    40 tablet    Take 1 tablet (15 mg) by mouth every 6 hours as needed for moderate to severe pain       OXYCODONE HCL PO      Take 10 mg by mouth every 8 hours       prochlorperazine 10 MG tablet    COMPAZINE    40 tablet    Take 1 tablet (10 mg) by mouth every 6 hours as needed for nausea or vomiting       senna-docusate 8.6-50 MG per tablet    SENOKOT-S;PERICOLACE    100 tablet    Take 2 tablets by mouth 2 times daily

## 2017-01-20 NOTE — Clinical Note
1/20/2017       RE: Vivi Day  [address] [facility] [address]     Dear Colleague,    Thank you for referring your patient, Vivi Day, to the [facility] NEUROSURGERY at [facility]. Please see a copy of my visit note below.      Neurosurgery  1/20/17  CHIEF COMPLAINT:  ER followup.      HISTORY OF PRESENT ILLNESS:  Ms. 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  She had well-documented persistent abnormal eye movements and her head was position sensitive.  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  Given that constellation of symptoms, she was offered a detethering procedure with release of a dorsal arachnoid adhesions and laminectomies C2 and C3.  This all took place on 12/01/2016, and she initially did quite well except for fairly significant posterior neck pain and spasm.  Her preop symptoms disappeared.  She had no evidence of fluctuance under the skin at first postoperative presentation, but by mid January, she had a new area of fluctuance develop under the skin at about the midpoint of the incision.  She went to the ER with positional headaches, worse when she sat up, eye pressure, nausea and vomiting.  She had new imaging performed at that time consisting of an MRI of brain and cervical spine, and these were reviewed with Dr. Elizalde who found no evidence of intracranial shift, hydrocephalus or hemorrhage.  She had a stable dilation of the fourth ventricle, stable postoperative changes of Chiari decompression with CSF collection at the suboccipital craniectomy site which is the new lesion noted on exam.  These films were reviewed with Dr. Elizalde.  "     She was treated with meds and released.  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  It is neither growing nor decreasing.  It is not particularly tender.  Her headache is completely resolved.  She still has a pressure behind the eyes and fairly persistent nausea.  She does not have any antiemetics at home.  She presents for routine followup.      PHYSICAL EXAMINATION:  On exam today, she has fairly stable eye movements.  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  Her headaches are gone.      IMPRESSION/PLAN:  I think we should probably get Ms. Gillis back into see Dr. Elizalde to discuss this new \"pressure\".  In the meantime, I am going to have her see Neurophthalmology and have her disks examined.      I also recommend we try an antiemetic for her.  She has tried Zofran in the past is plus/minus on whether it helps her all that much.  I suggested we go back to Compazine, and she is willing to try that.      So we'll have her see Dr. Elizalde after the Neurophthalmology visit.  Naturally, she knows she can and should call should there be questions, comments or concerns between now and the time of her next visit.         JUN LOONEY PA-C             D: 2017 12:39   T: 2017 11:47   MT: DUONG      Name:     PHYLLIS GILLIS   MRN:      [id number]        Account:      [id number]   :      1985           Service Date: 2017      Document: [id number]           "

## 2017-01-23 NOTE — PROGRESS NOTES
Neurosurgery  1/20/17  CHIEF COMPLAINT:  ER followup.      HISTORY OF PRESENT ILLNESS:  Ms. 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  She had well-documented persistent abnormal eye movements and her head was position sensitive.  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  Given that constellation of symptoms, she was offered a detethering procedure with release of a dorsal arachnoid adhesions and laminectomies C2 and C3.  This all took place on 12/01/2016, and she initially did quite well except for fairly significant posterior neck pain and spasm.  Her preop symptoms disappeared.  She had no evidence of fluctuance under the skin at first postoperative presentation, but by mid January, she had a new area of fluctuance develop under the skin at about the midpoint of the incision.  She went to the ER with positional headaches, worse when she sat up, eye pressure, nausea and vomiting.  She had new imaging performed at that time consisting of an MRI of brain and cervical spine, and these were reviewed with Dr. Elizalde who found no evidence of intracranial shift, hydrocephalus or hemorrhage.  She had a stable dilation of the fourth ventricle, stable postoperative changes of Chiari decompression with CSF collection at the suboccipital craniectomy site which is the new lesion noted on exam.  These films were reviewed with Dr. Elizalde.      She was treated with meds and released.  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  It is neither growing nor decreasing.  It is not particularly tender.  Her headache is completely resolved.  She still has a pressure behind the eyes and fairly persistent nausea.  She  "does not have any antiemetics at home.  She presents for routine followup.      PHYSICAL EXAMINATION:  On exam today, she has fairly stable eye movements.  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  Her headaches are gone.      IMPRESSION/PLAN:  I think we should probably get Ms. Gillis back into see Dr. Elizalde to discuss this new \"pressure\".  In the meantime, I am going to have her see Neurophthalmology and have her disks examined.      I also recommend we try an antiemetic for her.  She has tried Zofran in the past is plus/minus on whether it helps her all that much.  I suggested we go back to Compazine, and she is willing to try that.      So we'll have her see Dr. Elizalde after the Neurophthalmology visit.  Naturally, she knows she can and should call should there be questions, comments or concerns between now and the time of her next visit.         JUN LOONEY PA-C             D: 2017 12:39   T: 2017 11:47   MT: DUONG      Name:     PHYLLIS GILLIS   MRN:      [id number]        Account:      [id number]   :      1985           Service Date: 2017      Document: [id number]    "

## 2017-01-29 DIAGNOSIS — H53.10 SUBJECTIVE VISUAL DISTURBANCE: Primary | ICD-10-CM

## 2017-02-22 ENCOUNTER — OFFICE VISIT (OUTPATIENT)
Dept: NEUROSURGERY | Facility: CLINIC | Age: 32
End: 2017-02-22

## 2017-02-22 VITALS — BODY MASS INDEX: 42.59 KG/M2 | WEIGHT: 265 LBS | HEIGHT: 66 IN

## 2017-02-22 DIAGNOSIS — Q06.8 TETHERED SPINAL CORD (H): Primary | ICD-10-CM

## 2017-02-22 RX ORDER — METHOCARBAMOL 750 MG/1
750 TABLET, FILM COATED ORAL 3 TIMES DAILY PRN
Qty: 180 TABLET | Refills: 0 | Status: SHIPPED | OUTPATIENT
Start: 2017-02-22 | End: 2017-03-15

## 2017-02-22 ASSESSMENT — PAIN SCALES - GENERAL: PAINLEVEL: NO PAIN (0)

## 2017-02-22 NOTE — MR AVS SNAPSHOT
"              After Visit Summary   2017    Vivi Day    MRN: [id number]           Patient Information     Date Of Birth          1985        Visit Information        Provider Department      2017 11:15 AM Tremayne Elizalde MD [facility] Neurosurgery        Today's Diagnoses     Tethered spinal cord (H)    -  1       Follow-ups after your visit        Who to contact     Please call your clinic at [phone] to:    Ask questions about your health    Make or cancel appointments    Discuss your medicines    Learn about your test results    Speak to your doctor   If you have compliments or concerns about an experience at your clinic, or if you wish to file a complaint, please contact [facility] Patient Relations at [phone] or email us at [email or web address]         Additional Information About Your Visit        MyChart Information     Yoolink is an electronic gateway that provides easy, online access to your medical records. With Yoolink, you can request a clinic appointment, read your test results, renew a prescription or communicate with your care team.     To sign up for Heydayt visit the website at [email or web address]   You will be asked to enter the access code listed below, as well as some personal information. Please follow the directions to create your username and password.     Your access code is: [email or web address]  Expires: 2017  6:30 AM     Your access code will  in 90 days. If you need help or a new code, please contact your [facility] or call [phone] for assistance.        Care EveryWhere ID     This is your Care EveryWhere ID. This could be used by other organizations to access your [place] medical records  [id number]        Your Vitals Were     Height BMI (Body Mass Index)                1.676 m (5' 6\") 42.77 kg/m2           Blood Pressure from Last 3 Encounters:   17 (!) " 136/97   01/17/17 123/90   12/19/16 (!) 157/100    Weight from Last 3 Encounters:   02/22/17 120.2 kg (265 lb)   01/20/17 115.7 kg (255 lb)   01/17/17 113.4 kg (250 lb)              Today, you had the following     No orders found for display         Today's Medication Changes          These changes are accurate as of: 2/22/17 11:59 PM.  If you have any questions, ask your nurse or doctor.               These medicines have changed or have updated prescriptions.        Dose/Directions    * methocarbamol 750 MG tablet   Commonly known as:  ROBAXIN   This may have changed:  Another medication with the same name was added. Make sure you understand how and when to take each.        Dose:  750 mg   Take 1 tablet (750 mg) by mouth 3 times daily as needed for muscle spasms   Quantity:  60 tablet   Refills:  0       * methocarbamol 750 MG tablet   Commonly known as:  ROBAXIN   This may have changed:  You were already taking a medication with the same name, and this prescription was added. Make sure you understand how and when to take each.   Used for:  Tethered spinal cord (H)   Changed by:  Treamyne Elizalde MD        Dose:  750 mg   Take 1 tablet (750 mg) by mouth 3 times daily as needed for muscle spasms   Quantity:  180 tablet   Refills:  0       * Notice:  This list has 2 medication(s) that are the same as other medications prescribed for you. Read the directions carefully, and ask your doctor or other care provider to review them with you.      Stop taking these medicines if you haven't already. Please contact your care team if you have questions.     AMBIEN ELIER   Stopped by:  Tremayne Elizalde MD                Where to get your medicines      These medications were sent to [id number] - [place] - [address] [facility]  [address], [place] [address]     Phone:  [phone]     methocarbamol 750 MG tablet                Primary Care Provider  Office Phone # Fax #    Saranya Jiménez [phone]       [facility] [address] [facility] [address]        Thank you!     Thank you for choosing [facility]  for your care. Our goal is always to provide you with excellent care. Hearing back from our patients is one way we can continue to improve our services. Please take a few minutes to complete the written survey that you may receive in the mail after your visit with us. Thank you!             Your Updated Medication List - Protect others around you: Learn how to safely use, store and throw away your medicines at www.disposemymeds.org.          This list is accurate as of: 2/22/17 11:59 PM.  Always use your most recent med list.                   Brand Name Dispense Instructions for use    DIPHENHYDRAMINE HCL PO      Take 50 mg by mouth At Bedtime       gabapentin 400 MG capsule    NEURONTIN     Take 1 capsule by mouth in the AM and 1 capsule by mouth at noon. Then take 3 capsules by mouth at bedtime.       ibuprofen 800 MG tablet    ADVIL/MOTRIN    60 tablet    Take 1 tablet (800 mg) by mouth every 6 hours as needed for moderate pain       * methocarbamol 750 MG tablet    ROBAXIN    60 tablet    Take 1 tablet (750 mg) by mouth 3 times daily as needed for muscle spasms       * methocarbamol 750 MG tablet    ROBAXIN    180 tablet    Take 1 tablet (750 mg) by mouth 3 times daily as needed for muscle spasms       morphine 15 MG IR tablet    MSIR    40 tablet    Take 1 tablet (15 mg) by mouth every 6 hours as needed for moderate to severe pain       OXYCODONE HCL PO      Take 10 mg by mouth every 8 hours       prochlorperazine 10 MG tablet    COMPAZINE    40 tablet    Take 1 tablet (10 mg) by mouth every 6 hours as needed for nausea or vomiting       senna-docusate 8.6-50 MG per tablet    SENOKOT-S;PERICOLACE    100 tablet    Take 2 tablets by mouth 2 times daily       * Notice:  This list has 2 medication(s) that are the same  as other medications prescribed for you. Read the directions carefully, and ask your doctor or other care provider to review them with you.

## 2017-02-22 NOTE — NURSING NOTE
Chief Complaint   Patient presents with     RECHECK     MRI prior- tethered brain stem       Eun Wolf BRANDI

## 2017-02-22 NOTE — LETTER
2/22/2017       RE: Vivi Day  [address] [address] [address]     Dear Colleague,    Thank you for referring your patient, Vivi Day, to the [facility] NEUROSURGERY at [facility]. Please see a copy of my visit note below.    Ms. 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      She tells me that she has now recovered back to her physical baseline.  The following things have improved since preoperatively:  she is sleeping better, she is able to lie down with much less discomfort, she has less vomiting and she had trouble raising her right arm prior to surgery and this seems to have improved significantly.  Her headaches have not improved.        Her incision is well-healed.  She did have a brief problem with a subcutaneous effusion early in the postoperative course, but this resolved without further intervention.      IMAGING:  We reviewed the postoperative imaging.  She has substantially increased subarachnoid space surrounding the upper cervical spinal cord and lower brainstem.  There is still some sense that the cervicomedullary junction may be tethered bit posteriorly by its connections to the cerebellum but I explained to her that during surgery, I did everything I felt I could safely do to release the cord and we can see that the cord, itself, and the lower brainstem are clearly much better surrounded by spinal fluid.  I told her that this might, in fact, be the reason that she is able to extend her neck better now than she could because before the operation, the spinal cord and lower brainstem were tethered right up against the back of the spinal canal and now there is a substantial spinal fluid space there.      I also told her that further improvement is possible with time.      We have recommended some neck  exercises to restore full range of motion and I have asked her to keep in touch with us about her progress, but formal followup visit does not seem to be required at this time.      JENNA MANCUSO MD      D: 2017 18:44   T: 2017 10:03   MT: JOSH      Name:     PHYLLIS GILLIS   MRN:      [id number]        Account:      [id number]   :      1985           Service Date: 2017      Document: [id number]

## 2017-02-23 NOTE — PROGRESS NOTES
Ms. 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      She tells me that she has now recovered back to her physical baseline.  The following things have improved since preoperatively:  she is sleeping better, she is able to lie down with much less discomfort, she has less vomiting and she had trouble raising her right arm prior to surgery and this seems to have improved significantly.  Her headaches have not improved.        Her incision is well-healed.  She did have a brief problem with a subcutaneous effusion early in the postoperative course, but this resolved without further intervention.      IMAGING:  We reviewed the postoperative imaging.  She has substantially increased subarachnoid space surrounding the upper cervical spinal cord and lower brainstem.  There is still some sense that the cervicomedullary junction may be tethered bit posteriorly by its connections to the cerebellum but I explained to her that during surgery, I did everything I felt I could safely do to release the cord and we can see that the cord, itself, and the lower brainstem are clearly much better surrounded by spinal fluid.  I told her that this might, in fact, be the reason that she is able to extend her neck better now than she could because before the operation, the spinal cord and lower brainstem were tethered right up against the back of the spinal canal and now there is a substantial spinal fluid space there.      I also told her that further improvement is possible with time.      We have recommended some neck exercises to restore full range of motion and I have asked her to keep in touch with us about her progress, but formal followup visit does not seem to be required at this time.         JENNA MANCUSO MD             D: 02/22/2017 18:44   T: 02/23/2017 10:03   MT: JOSH      Name:     CARLIN  PHYLLIS   MRN:      [id number]        Account:      [id number]   :      1985           Service Date: 2017      Document: [id number]